# Patient Record
Sex: MALE
[De-identification: names, ages, dates, MRNs, and addresses within clinical notes are randomized per-mention and may not be internally consistent; named-entity substitution may affect disease eponyms.]

---

## 2023-08-28 ENCOUNTER — APPOINTMENT (OUTPATIENT)
Dept: ORTHOPEDIC SURGERY | Facility: CLINIC | Age: 28
End: 2023-08-28
Payer: COMMERCIAL

## 2023-08-28 DIAGNOSIS — E11.9 TYPE 2 DIABETES MELLITUS W/OUT COMPLICATIONS: ICD-10-CM

## 2023-08-28 PROBLEM — Z00.00 ENCOUNTER FOR PREVENTIVE HEALTH EXAMINATION: Status: ACTIVE | Noted: 2023-08-28

## 2023-08-28 PROCEDURE — 99203 OFFICE O/P NEW LOW 30 MIN: CPT

## 2023-08-28 PROCEDURE — 72170 X-RAY EXAM OF PELVIS: CPT

## 2023-08-28 PROCEDURE — 72110 X-RAY EXAM L-2 SPINE 4/>VWS: CPT

## 2023-08-28 NOTE — IMAGING
[de-identified] : LSPINE Inspection: no defects, deformity Palpation: No tenderness or spasm in bilateral and lumbar paraspinal musculature ROM: diminished all planes Motor: no focal deficit  Strength: 5/5 left hip flexors, knee extensors, ankle dorsiflexors, EHL, ankle plantarflexors; 4/5 R hip flexors, plantarflexors; otherwise 5/5.  Sensation I LT  + SLR R contralateral  Toe and heal walking difficulty RLE.  Gait non antalgic, non myelopathic

## 2023-08-28 NOTE — ASSESSMENT
[FreeTextEntry1] : Acute on chronic B/L LE radic R>L. Flare ups in the past treated with PT with good outcome. PE: RLE weakness, unable to toe and heel. Order LS MRI to eval nerve impingement. Resume PT.

## 2023-08-28 NOTE — HISTORY OF PRESENT ILLNESS
[Lower back] : lower back [10] : 10 [6] : 6 [Sharp] : sharp [Shooting] : shooting [Constant] : constant [de-identified] : 08/28/2023: This is a 28 year M with c/o of lower back. Pain ongoing for years. H/x of sciatica left sided. Now right sided pain down RLE. 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 Did PT for 2 months with good improvement. Reinjured lower back in July, went to ER, since then pain has worsened. Treated in the past with Indomethacin 50mg. Naproxen helps but having GI effects.  [] : no [FreeTextEntry3] : 7.4.2023 [FreeTextEntry5] : Patient states he felt back pain after pulling a heavy cooler 7.4.2023. He states he has a history of lower back pain/ sciatica since he was 18. Patient went to [facility] following the injury where he was told his back was in spasm. Denies numbness/tingling. sharp pain travels down the left leg.

## 2023-09-05 RX ORDER — TIZANIDINE 4 MG/1
4 TABLET ORAL TWICE DAILY
Qty: 20 | Refills: 0 | Status: ACTIVE | COMMUNITY
Start: 2023-09-05 | End: 1900-01-01

## 2023-09-10 ENCOUNTER — APPOINTMENT (OUTPATIENT)
Dept: MRI IMAGING | Facility: CLINIC | Age: 28
End: 2023-09-10
Payer: COMMERCIAL

## 2023-09-10 PROCEDURE — 72148 MRI LUMBAR SPINE W/O DYE: CPT

## 2023-09-21 ENCOUNTER — APPOINTMENT (OUTPATIENT)
Dept: ORTHOPEDIC SURGERY | Facility: CLINIC | Age: 28
End: 2023-09-21
Payer: COMMERCIAL

## 2023-09-21 PROCEDURE — 99214 OFFICE O/P EST MOD 30 MIN: CPT

## 2023-09-21 RX ORDER — HYDROCODONE BITARTRATE AND ACETAMINOPHEN 5; 325 MG/1; MG/1
5-325 TABLET ORAL
Qty: 30 | Refills: 0 | Status: ACTIVE | COMMUNITY
Start: 2023-09-05 | End: 1900-01-01

## 2023-09-21 RX ORDER — HYDROCODONE BITARTRATE AND ACETAMINOPHEN 10; 325 MG/1; MG/1
10-325 TABLET ORAL
Qty: 30 | Refills: 0 | Status: ACTIVE | COMMUNITY
Start: 2023-09-21 | End: 1900-01-01

## 2023-11-09 ENCOUNTER — APPOINTMENT (OUTPATIENT)
Dept: ORTHOPEDIC SURGERY | Facility: CLINIC | Age: 28
End: 2023-11-09
Payer: COMMERCIAL

## 2023-11-09 PROCEDURE — 99214 OFFICE O/P EST MOD 30 MIN: CPT

## 2023-12-28 ENCOUNTER — APPOINTMENT (OUTPATIENT)
Dept: ORTHOPEDIC SURGERY | Facility: CLINIC | Age: 28
End: 2023-12-28
Payer: COMMERCIAL

## 2023-12-28 VITALS — BODY MASS INDEX: 31.84 KG/M2 | HEIGHT: 69 IN | WEIGHT: 215 LBS

## 2023-12-28 PROCEDURE — 99214 OFFICE O/P EST MOD 30 MIN: CPT

## 2024-01-01 NOTE — IMAGING
[de-identified] : LSPINE Inspection: no defects, deformity Palpation: No tenderness or spasm in bilateral and lumbar paraspinal musculature ROM: diminished all planes Motor: no focal deficit  Strength: 5/5 left hip flexors, knee extensors, ankle dorsiflexors, EHL, ankle plantarflexors; 4/5 R hip flexors, plantarflexors; otherwise 5/5.  Sensation I LT  + SLR R contralateral  Toe and heal walking difficulty RLE.  Gait non antalgic, non myelopathic

## 2024-01-01 NOTE — ASSESSMENT
[FreeTextEntry1] : 29 yo M with persisting back and LLE radicular pain.  L Spine MRI: L4-5 HNP w/ extruded fragment w/ significant compression of L L5 ; very large compressive fragment;  Attending PT Experiencing tingling down lateral thigh and calf and weakness in LLE Refer to pain mgmt ASAP and c/w PT to strengthen LLE.  D/c Nsaids,  F/up 1 month.  given the ongoing symptoms, amount of time that has passed since onset  and the size of the CONTAINED fragment the odds are not insignificant that surgery will be needed;

## 2024-01-01 NOTE — HISTORY OF PRESENT ILLNESS
[Lower back] : lower back [10] : 10 [6] : 6 [Sharp] : sharp [Shooting] : shooting [Constant] : constant [de-identified] : 12/28/23: Follow Up- L SPINE. No changes since last visit. Attending PT. Has some bad and good days, today's a bad day. Experiencing tingling down lateral thigh and calf and weakness in LLE.   11/9/23: here for f/u - states he continues to improve with PT and indomethacin prn. continues with shooting pain down the left leg.   09/21/2023 Here for a f/u of lower back. Started PT with good improvement. Taking Indomethacin with relief. Symptoms overall improving.    08/28/2023: This is a 28 year M with c/o of lower back. Pain ongoing for years. H/x of sciatica left sided. Now right sided pain down RLE. 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 Did PT for 2 months with good improvement. Reinjured lower back in July, went to ER, since then pain has worsened. Treated in the past with Indomethacin 50mg. Naproxen helps but having GI effects.  [] : no [FreeTextEntry3] : 7.4.2023

## 2024-01-01 NOTE — DATA REVIEWED
[MRI] : MRI [Lumbar Spine] : lumbar spine [Report was reviewed and noted in the chart] : The report was reviewed and noted in the chart [I independently reviewed and interpreted images and report] : I independently reviewed and interpreted images and report [I reviewed the films/CD] : I reviewed the films/CD [FreeTextEntry1] : [facility] L-Spine 09/13/23  1. L4-5 HNP w/ extruded fragment w/ significant compression of L L5  2. L5-S1 central HNP w/o stenosis.

## 2024-01-26 ENCOUNTER — APPOINTMENT (OUTPATIENT)
Dept: PAIN MANAGEMENT | Facility: CLINIC | Age: 29
End: 2024-01-26
Payer: COMMERCIAL

## 2024-01-26 VITALS — BODY MASS INDEX: 31.84 KG/M2 | HEIGHT: 69 IN | WEIGHT: 215 LBS

## 2024-01-26 PROCEDURE — 99214 OFFICE O/P EST MOD 30 MIN: CPT

## 2024-01-26 NOTE — HISTORY OF PRESENT ILLNESS
[FreeTextEntry1] : pt states he is having pain in the lower back , the pain goes into the left side and goes down into the left leg  [Lower back] : lower back [10] : 10 [5] : 5 [Burning] : burning [Radiating] : radiating [Sharp] : sharp [Shooting] : shooting [Stabbing] : stabbing [Tingling] : tingling [Constant] : constant [Sleep] : sleep [Massage] : massage [Sitting] : sitting [Standing] : standing [Walking] : walking [Bending forward] : bending forward [Full time] : Work status: full time [] : no [FreeTextEntry6] : numbness   [de-identified] : heavy lifting

## 2024-01-26 NOTE — PHYSICAL EXAM

## 2024-01-26 NOTE — DISCUSSION/SUMMARY
[de-identified] : I personally reviewed the MRI/CT scan images and agree with the radiologist's report. The radiological findings were discussed with the patient  Patient is presenting with acute/sub-acute radicular pain with impairment in ADLs and functionality.  The pain has not responded to  conservative care including nsaid therapy and/or physical therapy.  There is no bleeding tendency, unstable medical condition, or systemic infection. The proposed procedure corresponds clinically to the dermatomal pattern of the affected nerve/nerves.  proceed with left l4-5 l5-s1 tfesi

## 2024-01-30 ENCOUNTER — APPOINTMENT (OUTPATIENT)
Dept: ORTHOPEDIC SURGERY | Facility: CLINIC | Age: 29
End: 2024-01-30
Payer: COMMERCIAL

## 2024-01-30 PROCEDURE — 99214 OFFICE O/P EST MOD 30 MIN: CPT

## 2024-01-30 RX ORDER — INDOMETHACIN 50 MG/1
50 CAPSULE ORAL
Qty: 42 | Refills: 0 | Status: ACTIVE | COMMUNITY
Start: 2023-09-08 | End: 1900-01-01

## 2024-01-30 NOTE — IMAGING
[de-identified] : LSPINE Inspection: no defects, deformity Palpation: No tenderness or spasm in bilateral and lumbar paraspinal musculature ROM: diminished all planes Motor: no focal deficit  Strength: 5/5 left hip flexors, knee extensors, ankle dorsiflexors, EHL, ankle plantarflexors; 4/5 R hip flexors, plantarflexors; otherwise 5/5.  Sensation I LT  + SLR R contralateral  Toe and heal walking difficulty RLE.  Gait non antalgic, non myelopathic

## 2024-01-30 NOTE — HISTORY OF PRESENT ILLNESS
[Lower back] : lower back [10] : 10 [6] : 6 [Sharp] : sharp [Shooting] : shooting [Constant] : constant [de-identified] :  01/30/2024: He remains at his same level of pain and dysfunction, Requesting renewal on indocin and therapy. He is set up for pain mgt procedure this upcoming friday.   12/28/23: Follow Up- L SPINE. No changes since last visit. Attending PT. Has some bad and good days, today's a bad day. Experiencing tingling down lateral thigh and calf and weakness in LLE.   11/9/23: here for f/u - states he continues to improve with PT and indomethacin prn. continues with shooting pain down the left leg.   09/21/2023 Here for a f/u of lower back. Started PT with good improvement. Taking Indomethacin with relief. Symptoms overall improving.    08/28/2023: This is a 28 year M with c/o of lower back. Pain ongoing for years. H/x of sciatica left sided. Now right sided pain down RLE. 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 Did PT for 2 months with good improvement. Reinjured lower back in July, went to ER, since then pain has worsened. Treated in the past with Indomethacin 50mg. Naproxen helps but having GI effects.  [] : no [FreeTextEntry3] : 7.4.2023 [FreeTextEntry5] : still has pain in L spine stopped physical therapy about 2 weeks ago due to progress diminishing waiting to get the shot

## 2024-02-02 ENCOUNTER — APPOINTMENT (OUTPATIENT)
Dept: PAIN MANAGEMENT | Facility: CLINIC | Age: 29
End: 2024-02-02

## 2024-02-02 NOTE — PROCEDURE
[FreeTextEntry3] : Date of Service: 02/02/2024   Account: [id number]  Patient: NIRAJ SANDOVAL   YOB: 1995  Age: 29 year   Surgeon: Jerry Vo M.D.  Assistant: None.  Pre-Operative Diagnosis: Lumbosacral radiculitis  Post Operative Diagnosis: Lumbosacral radiculitis  Procedure: Left L4-5, L5-S1 transforaminal epidural steroid injection under fluoroscopic guidance.             This procedure was carried out using fluoroscopic guidance.  The risks and benefits of the procedure were discussed extensively with the patient.  The consent of the patient was obtained and the following procedure was performed. The patient was placed in the prone position on the fluoroscopic table and the lumbar area was prepped and draped in a sterile fashion.  The left L4-5 and L5-S1 neural foramen were identified on left oblique  "sudha dog" anatomical view at the 6 o' clock position using fluoroscopic guidance, and the area was marked. The overlying skin and subcutaneous structures were anesthetized using sterile technique with 1% Lidocaine.  A 22 gauge spinal needle was directed toward the inferior (6 o'clock) position of the pedicle, which formed the roof of the identified foramens.  Once in the epidural space, after negative aspiration for heme and CSF, 1cc of Omnipaque contrast was injected to confirm epidural location and assess filling defects and rule out intravascular needle placement.   The following contrast flow and epidurogram was observed: no intravascular or intrathecal flow pattern was noted.  No blood or CSF was aspirated. Omnipaque spread appeared to outline the left L4 and L5 nerve roots and spread medially into the epidural space.    After this, an injectate of 3 cc preservative free normal saline plus 40 mg of kenalog was injected in the epidural space at each of the two levels.  The needle was subsequently removed.  Vital signs remained normal.  Pulse oximeter was used throughout the procedure and the patient's pulse and oxygen saturation remained within normal limits.  The patient tolerated the procedure well.  There were no complications.  The patient was instructed to apply ice over the injection sites for twenty minutes every two hours for the next 24 to 48 hours.  The patient was also instructed to contact me immediately if there were any problems.   Jerry Vo M.D.

## 2024-02-08 ENCOUNTER — APPOINTMENT (OUTPATIENT)
Dept: PAIN MANAGEMENT | Facility: CLINIC | Age: 29
End: 2024-02-08
Payer: COMMERCIAL

## 2024-02-08 PROCEDURE — 64483 NJX AA&/STRD TFRM EPI L/S 1: CPT | Mod: LT

## 2024-02-08 PROCEDURE — 64484 NJX AA&/STRD TFRM EPI L/S EA: CPT | Mod: LT

## 2024-02-08 NOTE — PROCEDURE
[FreeTextEntry3] : Date of Service: 02/08/2024   Account: [id number]  Patient: NIRAJ SANDOVAL   YOB: 1995  Age: 29 year   Surgeon: Jerry Vo M.D.  Assistant: None.  Pre-Operative Diagnosis: Lumbosacral radiculitis  Post Operative Diagnosis: Lumbosacral radiculitis  Procedure: Left L4-5, L5-S1 transforaminal epidural steroid injection under fluoroscopic guidance.             This procedure was carried out using fluoroscopic guidance.  The risks and benefits of the procedure were discussed extensively with the patient.  The consent of the patient was obtained and the following procedure was performed. The patient was placed in the prone position on the fluoroscopic table and the lumbar area was prepped and draped in a sterile fashion.  The left L4-5 and L5-S1 neural foramen were identified on left oblique  "sudha dog" anatomical view at the 6 o' clock position using fluoroscopic guidance, and the area was marked. The overlying skin and subcutaneous structures were anesthetized using sterile technique with 1% Lidocaine.  A 22 gauge spinal needle was directed toward the inferior (6 o'clock) position of the pedicle, which formed the roof of the identified foramens.  Once in the epidural space, after negative aspiration for heme and CSF, 1cc of Omnipaque contrast was injected to confirm epidural location and assess filling defects and rule out intravascular needle placement.   The following contrast flow and epidurogram was observed: no intravascular or intrathecal flow pattern was noted.  No blood or CSF was aspirated. Omnipaque spread appeared to outline the left L4 and L5 nerve roots and spread medially into the epidural space.    After this, an injectate of 3 cc preservative free normal saline plus 40 mg of kenalog was injected in the epidural space at each of the two levels.  The needle was subsequently removed.  Vital signs remained normal.  Pulse oximeter was used throughout the procedure and the patient's pulse and oxygen saturation remained within normal limits.  The patient tolerated the procedure well.  There were no complications.  The patient was instructed to apply ice over the injection sites for twenty minutes every two hours for the next 24 to 48 hours.  The patient was also instructed to contact me immediately if there were any problems.   Jerry Vo M.D.

## 2024-02-28 ENCOUNTER — APPOINTMENT (OUTPATIENT)
Dept: PAIN MANAGEMENT | Facility: CLINIC | Age: 29
End: 2024-02-28
Payer: COMMERCIAL

## 2024-02-28 VITALS — BODY MASS INDEX: 31.84 KG/M2 | HEIGHT: 69 IN | WEIGHT: 215 LBS

## 2024-02-28 PROCEDURE — 99214 OFFICE O/P EST MOD 30 MIN: CPT

## 2024-02-28 NOTE — HISTORY OF PRESENT ILLNESS
[Lower back] : lower back [10] : 10 [5] : 5 [Burning] : burning [Radiating] : radiating [Sharp] : sharp [Stabbing] : stabbing [Shooting] : shooting [Tingling] : tingling [Constant] : constant [Sleep] : sleep [Massage] : massage [Sitting] : sitting [Standing] : standing [Walking] : walking [Bending forward] : bending forward [Full time] : Work status: full time [FreeTextEntry1] : LT L4-5,L5-S1 TFESI- 2/8/2024 [3] : 3 [Intermittent] : intermittent [Injection therapy] : injection therapy [] : no [FreeTextEntry6] : numbness   [FreeTextEntry7] : left side  [de-identified] : heavy lifting , getting up from sitting

## 2024-02-28 NOTE — PHYSICAL EXAM

## 2024-02-28 NOTE — ASSESSMENT
[FreeTextEntry1] : L L45 L5S1 TFESI with 60-80% relief improved rom and adls  states that paresthesias in leg are now more pronounced as is lower back pain

## 2024-03-14 ENCOUNTER — APPOINTMENT (OUTPATIENT)
Dept: ORTHOPEDIC SURGERY | Facility: CLINIC | Age: 29
End: 2024-03-14
Payer: COMMERCIAL

## 2024-03-14 PROCEDURE — 99213 OFFICE O/P EST LOW 20 MIN: CPT

## 2024-03-18 NOTE — ASSESSMENT
[FreeTextEntry1] : 27 yo M with persisting numbness and tingling LLE entire leg to his toes and L leg weakness.  Significant improvement in LLE radicular pain with QASIM 2/8/24, low back pain persists, Dr. Vo recommended repeat QASIM for lbp.    L Spine MRI: L4-5 HNP w/ extruded fragment w/ significant compression of L L5;  very large compressive fragment;   Reviewed with him the longer the nerve is compressed, the likelihood or prognosis that the numbness and tingling will get better given the ongoing symptoms, amount of time that has passed since onset and the size of the CONTAINED fragment the odds are not insignificant that surgery is recommended and needed;  With his continued L leg weakness that has failed to fully improve with conservative treatment, surgery is needed.   Will consider surgery as discussed, he is not ready. Will restart PT.

## 2024-03-18 NOTE — DATA REVIEWED
[MRI] : MRI [Lumbar Spine] : lumbar spine [Report was reviewed and noted in the chart] : The report was reviewed and noted in the chart [I reviewed the films/CD] : I reviewed the films/CD [I independently reviewed and interpreted images and report] : I independently reviewed and interpreted images and report [FreeTextEntry1] : [facility] L-Spine 09/13/23  1. L4-5 HNP w/ extruded fragment w/ significant compression of L L5  2. L5-S1 central HNP w/o stenosis.

## 2024-03-18 NOTE — IMAGING
[de-identified] : LSPINE Inspection: no defects, deformity Palpation: No tenderness or spasm in bilateral and lumbar paraspinal musculature ROM: diminished all planes Motor: no focal deficit  Strength: 5/5 left hip flexors, knee extensors, ankle dorsiflexors, EHL, ankle plantarflexors; 4/5 R hip flexors, plantarflexors; otherwise 5/5.  Sensation I LT  + SLR R contralateral  Toe and heal walking difficulty RLE.  Gait non antalgic, non myelopathic

## 2024-03-18 NOTE — HISTORY OF PRESENT ILLNESS
[Lower back] : lower back [6] : 6 [10] : 10 [Shooting] : shooting [Sharp] : sharp [Constant] : constant [de-identified] : 03/14/2024: pt is here today for follow up on spine. 80% relief of L leg and 20% to back with L L4-5, L5-S1 TFESI (2/8/24) with Dr. Vo.  Shooting pain to his calf improved. LBP with sitting, driving and prone. He continues to c/o LLE numbness and tingling persists and is constant. He feels LLE is weak.  No loss b/b control.  He is able to sleep.    01/30/2024: He remains at his same level of pain and dysfunction, Requesting renewal on indocin and therapy. He is set up for pain mgt procedure this upcoming friday.   12/28/23: Follow Up- L SPINE. No changes since last visit. Attending PT. Has some bad and good days, today's a bad day. Experiencing tingling down lateral thigh and calf and weakness in LLE.   11/9/23: here for f/u - states he continues to improve with PT and indomethacin prn. continues with shooting pain down the left leg.   09/21/2023 Here for a f/u of lower back. Started PT with good improvement. Taking Indomethacin with relief. Symptoms overall improving.    08/28/2023: This is a 28 year M with c/o of lower back. Pain ongoing for years. H/x of sciatica left sided. Now right sided pain down RLE. 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 Did PT for 2 months with good improvement. Reinjured lower back in July, went to ER, since then pain has worsened. Treated in the past with Indomethacin 50mg. Naproxen helps but having GI effects.  [FreeTextEntry3] : 7.4.2023 [] : no [FreeTextEntry5] : still has pain in L spine stopped physical therapy about 2 weeks ago due to progress diminishing waiting to get the shot

## 2024-04-11 ENCOUNTER — APPOINTMENT (OUTPATIENT)
Dept: ORTHOPEDIC SURGERY | Facility: CLINIC | Age: 29
End: 2024-04-11
Payer: COMMERCIAL

## 2024-04-11 DIAGNOSIS — Z78.9 OTHER SPECIFIED HEALTH STATUS: ICD-10-CM

## 2024-04-11 PROCEDURE — 99213 OFFICE O/P EST LOW 20 MIN: CPT

## 2024-04-11 NOTE — IMAGING
[de-identified] : LSPINE Inspection: no defects, deformity Palpation: No tenderness or spasm in bilateral and lumbar paraspinal musculature ROM: diminished all planes Motor: no focal deficit  Strength: 5/5 left hip flexors, knee extensors, ankle dorsiflexors, EHL, ankle plantarflexors; 4/5 R hip flexors, plantarflexors; otherwise 5/5.  Sensation I LT  + SLR R contralateral  Toe and heal walking difficulty RLE.  Gait non antalgic, non myelopathic

## 2024-04-11 NOTE — HISTORY OF PRESENT ILLNESS
[Lower back] : lower back [10] : 10 [6] : 6 [Sharp] : sharp [Shooting] : shooting [Constant] : constant [de-identified] : 4/11/24 here for fu, reports he again is experiencing LLE pain, numbness and tingling. He reports significant relief after LESI but now returned for about 2 weeks. Has not followed up with pain management to discuss possible repeat injection.   03/14/2024: pt is here today for follow up on spine. 80% relief of L leg and 20% to back with L L4-5, L5-S1 TFESI (2/8/24) with Dr. Vo.  Shooting pain to his calf improved. LBP with sitting, driving and prone. He continues to c/o LLE numbness and tingling persists and is constant. He feels LLE is weak.  No loss b/b control.  He is able to sleep.    01/30/2024: He remains at his same level of pain and dysfunction, Requesting renewal on indocin and therapy. He is set up for pain mgt procedure this upcoming friday.   12/28/23: Follow Up- L SPINE. No changes since last visit. Attending PT. Has some bad and good days, today's a bad day. Experiencing tingling down lateral thigh and calf and weakness in LLE.   11/9/23: here for f/u - states he continues to improve with PT and indomethacin prn. continues with shooting pain down the left leg.   09/21/2023 Here for a f/u of lower back. Started PT with good improvement. Taking Indomethacin with relief. Symptoms overall improving.    08/28/2023: This is a 28 year M with c/o of lower back. Pain ongoing for years. H/x of sciatica left sided. Now right sided pain down RLE. 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 Did PT for 2 months with good improvement. Reinjured lower back in July, went to ER, since then pain has worsened. Treated in the past with Indomethacin 50mg. Naproxen helps but having GI effects.  [] : no [FreeTextEntry3] : 7.4.2023 [FreeTextEntry5] : Follow Up- L Spine. Has 1st QASIM 02/2024. Helped up until 2 weeks ago when he started feeling pain down left leg once again. Hasn't seen [facility] since pain re-started.  [FreeTextEntry7] : left leg

## 2024-04-11 NOTE — ASSESSMENT
[FreeTextEntry1] : 29 yo M with persisting numbness and tingling LLE entire leg to his toes and L leg weakness.  Significant improvement in LLE radicular pain with QASIM 2/8/24, but reports LLE radicular symptoms have returend.  Discussed follow up with Dr. Vo to discuss repeat QASIM  Begin PT, scheduled to start 4/19    L Spine MRI: L4-5 HNP w/ extruded fragment w/ significant compression of L L5;  very large compressive fragment;   Reviewed with him the longer the nerve is compressed, the likelihood or prognosis that the numbness and tingling will get better given the ongoing symptoms, amount of time that has passed since onset and the size of the CONTAINED fragment the odds are not insignificant that surgery is recommended and needed;  With his continued L leg weakness that has failed to fully improve with conservative treatment, surgery is needed.   Will consider surgery as discussed, he is not ready at this time.

## 2024-05-10 ENCOUNTER — APPOINTMENT (OUTPATIENT)
Dept: PAIN MANAGEMENT | Facility: CLINIC | Age: 29
End: 2024-05-10

## 2024-05-31 ENCOUNTER — APPOINTMENT (OUTPATIENT)
Dept: PAIN MANAGEMENT | Facility: CLINIC | Age: 29
End: 2024-05-31
Payer: COMMERCIAL

## 2024-05-31 PROCEDURE — 64483 NJX AA&/STRD TFRM EPI L/S 1: CPT | Mod: LT

## 2024-05-31 PROCEDURE — 64484 NJX AA&/STRD TFRM EPI L/S EA: CPT | Mod: 59,LT

## 2024-05-31 PROCEDURE — 82948 REAGENT STRIP/BLOOD GLUCOSE: CPT

## 2024-05-31 NOTE — PROCEDURE
[FreeTextEntry3] : Date of Service: 05/31/2024   Account: [id number]   Patient: NIRAJ SANDOVAL   YOB: 1995   Age: 29 year     Surgeon: Jerry Vo M.D.   Assistant: None.   Pre-Operative Diagnosis: Lumbosacral radiculitis   Post Operative Diagnosis: Lumbosacral radiculitis   Procedure: Left L4-5, L5-S1 transforaminal epidural steroid injection under fluoroscopic guidance.           This procedure was carried out using fluoroscopic guidance.  The risks and benefits of the procedure were discussed extensively with the patient.  The consent of the patient was obtained and the following procedure was performed. The patient was placed in the prone position on the fluoroscopic table and the lumbar area was prepped and draped in a sterile fashion.   The left L4-5 and L5-S1 neural foramen were identified on left oblique  "sudha dog" anatomical view at the 6 o' clock position using fluoroscopic guidance, and the area was marked. The overlying skin and subcutaneous structures were anesthetized using sterile technique with 1% Lidocaine.  A 22 gauge spinal needle was directed toward the inferior (6 o'clock) position of the pedicle, which formed the roof of the identified foramens.  Once in the epidural space, after negative aspiration for heme and CSF, 1cc of Omnipaque contrast was injected to confirm epidural location and assess filling defects and rule out intravascular needle placement.   The following contrast flow and epidurogram was observed: no intravascular or intrathecal flow pattern was noted.  No blood or CSF was aspirated. Omnipaque spread appeared to outline the left L4 and L5 nerve roots and spread medially into the epidural space.   After this, an injectate of 3 cc preservative free normal saline plus 40 mg of kenalog was injected in the epidural space at each of the two levels.   The needle was subsequently removed.  Vital signs remained normal.  Pulse oximeter was used throughout the procedure and the patient's pulse and oxygen saturation remained within normal limits.  The patient tolerated the procedure well.  There were no complications.  The patient was instructed to apply ice over the injection sites for twenty minutes every two hours for the next 24 to 48 hours.  The patient was also instructed to contact me immediately if there were any problems.     Jerry Vo M.D.
Her/She

## 2024-06-10 ENCOUNTER — APPOINTMENT (OUTPATIENT)
Dept: PAIN MANAGEMENT | Facility: CLINIC | Age: 29
End: 2024-06-10
Payer: COMMERCIAL

## 2024-06-10 VITALS — BODY MASS INDEX: 33.33 KG/M2 | HEIGHT: 69 IN | WEIGHT: 225 LBS

## 2024-06-10 DIAGNOSIS — M54.16 RADICULOPATHY, LUMBAR REGION: ICD-10-CM

## 2024-06-10 DIAGNOSIS — M51.26 OTHER INTERVERTEBRAL DISC DISPLACEMENT, LUMBAR REGION: ICD-10-CM

## 2024-06-10 PROCEDURE — 99213 OFFICE O/P EST LOW 20 MIN: CPT

## 2024-06-11 NOTE — ASSESSMENT
[FreeTextEntry1] : L TFESI with 90% relief pp with improved rom and adls  c/o pain and soreness now on R side in same distribution

## 2024-06-11 NOTE — HISTORY OF PRESENT ILLNESS
[Lower back] : lower back [10] : 10 [3] : 3 [Burning] : burning [Radiating] : radiating [Sharp] : sharp [Shooting] : shooting [Stabbing] : stabbing [Tingling] : tingling [Intermittent] : intermittent [Massage] : massage [Injection therapy] : injection therapy [Sitting] : sitting [Standing] : standing [Walking] : walking [Bending forward] : bending forward [Full time] : Work status: full time [4] : 4 [FreeTextEntry1] : LT L4-5,L5-S1 TFESI- 2/8/2024; pt is reporting relief on lt side but now has same pain on rt side    LT L4-5, L5-S1 TFESI - 05/31/2024 [] : no [FreeTextEntry6] : numbness   [FreeTextEntry7] : rt side [de-identified] : heavy lifting , getting up from sitting

## 2024-06-11 NOTE — PHYSICAL EXAM
[de-identified] : PHYSICAL EXAM  Constitutional:  Appears well, no apparent distress Ability to communicate: Normal Respiratory: non-labored breathing Skin: no rash noted Head: normocephalic, atraumatic Neck: no visible thyroid enlargement Eyes: extraocular movements intact Neurologic: alert and oriented x3 Psychiatric: normal mood, affect, and behavior  Lumbar Spine:  Palpation: right lumbar paraspinal spasm and right lumbar paraspinal tenderness to palpation. ROM: Diminished range of motion in all plains.  Patient notes pain with lateral bending to the right. MMT: Motor exam is 5/5 through out bilateral lower extremities. Sensation: Light touch and pain is intact throughout bilateral lower extremities. Reflexes: achilles and patella reflexes are intact and  symmetrical.  No sustained clonus. Special Testing: Positive straight leg raise on the right side.

## 2024-07-08 ENCOUNTER — APPOINTMENT (OUTPATIENT)
Dept: ORTHOPEDIC SURGERY | Facility: CLINIC | Age: 29
End: 2024-07-08

## 2024-07-18 ENCOUNTER — APPOINTMENT (OUTPATIENT)
Dept: ORTHOPEDIC SURGERY | Facility: CLINIC | Age: 29
End: 2024-07-18

## 2024-07-18 DIAGNOSIS — M51.26 OTHER INTERVERTEBRAL DISC DISPLACEMENT, LUMBAR REGION: ICD-10-CM

## 2024-07-18 DIAGNOSIS — M54.16 RADICULOPATHY, LUMBAR REGION: ICD-10-CM

## 2024-07-18 PROCEDURE — 99212 OFFICE O/P EST SF 10 MIN: CPT

## 2024-09-26 ENCOUNTER — APPOINTMENT (OUTPATIENT)
Dept: ORTHOPEDIC SURGERY | Facility: CLINIC | Age: 29
End: 2024-09-26
Payer: COMMERCIAL

## 2024-09-26 DIAGNOSIS — M51.26 OTHER INTERVERTEBRAL DISC DISPLACEMENT, LUMBAR REGION: ICD-10-CM

## 2024-09-26 PROCEDURE — 99213 OFFICE O/P EST LOW 20 MIN: CPT

## 2024-09-26 NOTE — HISTORY OF PRESENT ILLNESS
[Lower back] : lower back [10] : 10 [6] : 6 [Sharp] : sharp [Shooting] : shooting [] : yes [Constant] : constant

## 2024-12-19 ENCOUNTER — APPOINTMENT (OUTPATIENT)
Dept: ORTHOPEDIC SURGERY | Facility: CLINIC | Age: 29
End: 2024-12-19
Payer: COMMERCIAL

## 2024-12-19 DIAGNOSIS — M54.16 RADICULOPATHY, LUMBAR REGION: ICD-10-CM

## 2024-12-19 DIAGNOSIS — M51.26 OTHER INTERVERTEBRAL DISC DISPLACEMENT, LUMBAR REGION: ICD-10-CM

## 2024-12-19 PROCEDURE — 99213 OFFICE O/P EST LOW 20 MIN: CPT

## 2025-01-30 ENCOUNTER — APPOINTMENT (OUTPATIENT)
Dept: ORTHOPEDIC SURGERY | Facility: CLINIC | Age: 30
End: 2025-01-30
Payer: COMMERCIAL

## 2025-01-30 VITALS — HEIGHT: 69 IN | WEIGHT: 225 LBS | BODY MASS INDEX: 33.33 KG/M2

## 2025-01-30 DIAGNOSIS — M51.26 OTHER INTERVERTEBRAL DISC DISPLACEMENT, LUMBAR REGION: ICD-10-CM

## 2025-01-30 DIAGNOSIS — M54.16 RADICULOPATHY, LUMBAR REGION: ICD-10-CM

## 2025-01-30 PROCEDURE — 99213 OFFICE O/P EST LOW 20 MIN: CPT

## 2025-05-01 ENCOUNTER — APPOINTMENT (OUTPATIENT)
Dept: ORTHOPEDIC SURGERY | Facility: CLINIC | Age: 30
End: 2025-05-01